# Patient Record
Sex: FEMALE | Race: ASIAN | NOT HISPANIC OR LATINO
[De-identification: names, ages, dates, MRNs, and addresses within clinical notes are randomized per-mention and may not be internally consistent; named-entity substitution may affect disease eponyms.]

---

## 2020-10-08 ENCOUNTER — NON-APPOINTMENT (OUTPATIENT)
Age: 50
End: 2020-10-08

## 2020-10-08 ENCOUNTER — APPOINTMENT (OUTPATIENT)
Dept: INTERNAL MEDICINE | Facility: CLINIC | Age: 50
End: 2020-10-08
Payer: MEDICAID

## 2020-10-08 VITALS
SYSTOLIC BLOOD PRESSURE: 134 MMHG | HEART RATE: 81 BPM | WEIGHT: 130 LBS | OXYGEN SATURATION: 98 % | DIASTOLIC BLOOD PRESSURE: 79 MMHG | BODY MASS INDEX: 23.04 KG/M2 | HEIGHT: 63 IN | TEMPERATURE: 98.6 F

## 2020-10-08 DIAGNOSIS — Z87.891 PERSONAL HISTORY OF NICOTINE DEPENDENCE: ICD-10-CM

## 2020-10-08 DIAGNOSIS — Z82.49 FAMILY HISTORY OF ISCHEMIC HEART DISEASE AND OTHER DISEASES OF THE CIRCULATORY SYSTEM: ICD-10-CM

## 2020-10-08 DIAGNOSIS — Z80.0 FAMILY HISTORY OF MALIGNANT NEOPLASM OF DIGESTIVE ORGANS: ICD-10-CM

## 2020-10-08 PROCEDURE — 36415 COLL VENOUS BLD VENIPUNCTURE: CPT

## 2020-10-08 PROCEDURE — G0444 DEPRESSION SCREEN ANNUAL: CPT | Mod: NC,59

## 2020-10-08 PROCEDURE — 93000 ELECTROCARDIOGRAM COMPLETE: CPT | Mod: 59

## 2020-10-08 PROCEDURE — 99386 PREV VISIT NEW AGE 40-64: CPT | Mod: 25

## 2020-10-08 NOTE — HISTORY OF PRESENT ILLNESS
[FreeTextEntry1] : New patient physical [de-identified] : 49 y/o f. hx of Sjogren's on plaquenil BID (recent optho exam completed) presenting for CPE\par Notes elevation of SBP to 140s at rheumatologist\par Endorses high stress relate to father's diagnosis of pancreatic cancer and long work hours. 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 Pain resolves spontaneously.  Also with FREEMAN with some activities. \par Not utd w/ mammogram, no prior colonscopy\par Mood is stable -good family support \par

## 2020-10-08 NOTE — PHYSICAL EXAM
[No Acute Distress] : no acute distress [Well Nourished] : well nourished [Well Developed] : well developed [Well-Appearing] : well-appearing [Normal Sclera/Conjunctiva] : normal sclera/conjunctiva [PERRL] : pupils equal round and reactive to light [EOMI] : extraocular movements intact [No JVD] : no jugular venous distention [No Lymphadenopathy] : no lymphadenopathy [Supple] : supple [Thyroid Normal, No Nodules] : the thyroid was normal and there were no nodules present [No Respiratory Distress] : no respiratory distress  [No Accessory Muscle Use] : no accessory muscle use [Clear to Auscultation] : lungs were clear to auscultation bilaterally [Normal Rate] : normal rate  [Regular Rhythm] : with a regular rhythm [Normal S1, S2] : normal S1 and S2 [No Murmur] : no murmur heard [Pedal Pulses Present] : the pedal pulses are present [No Edema] : there was no peripheral edema [No Extremity Clubbing/Cyanosis] : no extremity clubbing/cyanosis [Soft] : abdomen soft [Non Tender] : non-tender [Non-distended] : non-distended [No Masses] : no abdominal mass palpated [No HSM] : no HSM [Normal Bowel Sounds] : normal bowel sounds [Normal Posterior Cervical Nodes] : no posterior cervical lymphadenopathy [Normal Anterior Cervical Nodes] : no anterior cervical lymphadenopathy [No CVA Tenderness] : no CVA  tenderness [No Spinal Tenderness] : no spinal tenderness [No Joint Swelling] : no joint swelling [Grossly Normal Strength/Tone] : grossly normal strength/tone [No Rash] : no rash [Coordination Grossly Intact] : coordination grossly intact [No Focal Deficits] : no focal deficits [Normal Gait] : normal gait [Normal Affect] : the affect was normal [Normal Insight/Judgement] : insight and judgment were intact

## 2020-10-08 NOTE — HEALTH RISK ASSESSMENT
[Yes] : Yes [Patient reported mammogram was normal] : Patient reported mammogram was normal [Employed] : employed [] :  [# Of Children ___] : has [unfilled] children [Fully functional (bathing, dressing, toileting, transferring, walking, feeding)] : Fully functional (bathing, dressing, toileting, transferring, walking, feeding) [Fully functional (using the telephone, shopping, preparing meals, housekeeping, doing laundry, using] : Fully functional and needs no help or supervision to perform IADLs (using the telephone, shopping, preparing meals, housekeeping, doing laundry, using transportation, managing medications and managing finances) [] : No [de-identified] : Former smoker [Reports changes in hearing] : Reports no changes in hearing [Reports changes in vision] : Reports no changes in vision [MammogramDate] : 01/18 [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

## 2020-10-09 LAB
BASOPHILS # BLD AUTO: 0.09 K/UL
BASOPHILS NFR BLD AUTO: 1.2 %
EOSINOPHIL # BLD AUTO: 0.08 K/UL
EOSINOPHIL NFR BLD AUTO: 1.1 %
ESTIMATED AVERAGE GLUCOSE: 105 MG/DL
HBA1C MFR BLD HPLC: 5.3 %
HCT VFR BLD CALC: 39.8 %
HGB BLD-MCNC: 12.8 G/DL
IMM GRANULOCYTES NFR BLD AUTO: 0.3 %
LYMPHOCYTES # BLD AUTO: 2.69 K/UL
LYMPHOCYTES NFR BLD AUTO: 35.5 %
MAN DIFF?: NORMAL
MCHC RBC-ENTMCNC: 30.3 PG
MCHC RBC-ENTMCNC: 32.2 GM/DL
MCV RBC AUTO: 94.1 FL
MONOCYTES # BLD AUTO: 0.52 K/UL
MONOCYTES NFR BLD AUTO: 6.9 %
NEUTROPHILS # BLD AUTO: 4.18 K/UL
NEUTROPHILS NFR BLD AUTO: 55 %
PLATELET # BLD AUTO: 208 K/UL
RBC # BLD: 4.23 M/UL
RBC # FLD: 12.4 %
WBC # FLD AUTO: 7.58 K/UL

## 2020-10-12 LAB
25(OH)D3 SERPL-MCNC: 60.9 NG/ML
ALBUMIN SERPL ELPH-MCNC: 4.9 G/DL
ALP BLD-CCNC: 73 U/L
ALT SERPL-CCNC: 16 U/L
ANION GAP SERPL CALC-SCNC: 15 MMOL/L
APPEARANCE: CLEAR
AST SERPL-CCNC: 18 U/L
BILIRUB SERPL-MCNC: 0.4 MG/DL
BILIRUBIN URINE: NEGATIVE
BLOOD URINE: NEGATIVE
BUN SERPL-MCNC: 18 MG/DL
CALCIUM SERPL-MCNC: 9.9 MG/DL
CHLORIDE SERPL-SCNC: 100 MMOL/L
CHOLEST SERPL-MCNC: 203 MG/DL
CHOLEST/HDLC SERPL: 2 RATIO
CO2 SERPL-SCNC: 26 MMOL/L
COLOR: NORMAL
CREAT SERPL-MCNC: 0.7 MG/DL
GLUCOSE QUALITATIVE U: NEGATIVE
GLUCOSE SERPL-MCNC: 110 MG/DL
HDLC SERPL-MCNC: 103 MG/DL
KETONES URINE: NEGATIVE
LDLC SERPL CALC-MCNC: 85 MG/DL
LEUKOCYTE ESTERASE URINE: NEGATIVE
NITRITE URINE: NEGATIVE
PH URINE: 6
POTASSIUM SERPL-SCNC: 3.7 MMOL/L
PROT SERPL-MCNC: 7.3 G/DL
PROTEIN URINE: NEGATIVE
SODIUM SERPL-SCNC: 142 MMOL/L
SPECIFIC GRAVITY URINE: 1.01
TRIGL SERPL-MCNC: 79 MG/DL
UROBILINOGEN URINE: NORMAL

## 2020-10-19 ENCOUNTER — APPOINTMENT (OUTPATIENT)
Dept: CARDIOLOGY | Facility: CLINIC | Age: 50
End: 2020-10-19

## 2020-12-07 ENCOUNTER — APPOINTMENT (OUTPATIENT)
Dept: CARDIOLOGY | Facility: CLINIC | Age: 50
End: 2020-12-07
Payer: MEDICAID

## 2020-12-07 ENCOUNTER — NON-APPOINTMENT (OUTPATIENT)
Age: 50
End: 2020-12-07

## 2020-12-07 VITALS
HEART RATE: 74 BPM | SYSTOLIC BLOOD PRESSURE: 139 MMHG | HEIGHT: 63 IN | OXYGEN SATURATION: 99 % | DIASTOLIC BLOOD PRESSURE: 83 MMHG | RESPIRATION RATE: 16 BRPM | TEMPERATURE: 98.6 F | BODY MASS INDEX: 23.57 KG/M2 | WEIGHT: 133 LBS

## 2020-12-07 PROCEDURE — 99072 ADDL SUPL MATRL&STAF TM PHE: CPT

## 2020-12-07 PROCEDURE — 99203 OFFICE O/P NEW LOW 30 MIN: CPT

## 2020-12-07 PROCEDURE — 93000 ELECTROCARDIOGRAM COMPLETE: CPT

## 2020-12-07 NOTE — REASON FOR VISIT
[FreeTextEntry1] : 50F Sjogren’s on plaquenil who presents for evaluation of atypical chest pain.\par \par For the past few months reports atypical chest pressure. Had a difficult year with loss of family members. The frequency of these episodes has decreased since the summer.\par \par She is less active due to the weather, pandemic, and due to long hours at work. No dizziness, palpitations, orthopnea, or PND. Had pedal edema only at the end of the day. Some mild FREEMAN with two flights of stairs.\par \par \par

## 2020-12-07 NOTE — DISCUSSION/SUMMARY
[FreeTextEntry1] : FREEMAN and atypical chest pain\par Sjogren's\par mild abnormal ECG\par mildly elevated BP\par \par -check TTE for structural heart disease and to assess PA pressures\par -check treadmill stress test for ischemic ECG changes, HR/BP response to exercise\par -she is already incorporating a heart-healthy diet into her routine. encouraged low-sodium diet. If exercise stress testing is without significant findings, will discuss the role for moderate to high intensity exercise for cardiovascular health.

## 2020-12-07 NOTE — REVIEW OF SYSTEMS
[Fever] : no fever [Chills] : no chills [Feeling Fatigued] : feeling fatigued [see HPI] : see HPI [Abdominal Pain] : no abdominal pain [Heartburn] : no heartburn [Dysphagia] : no dysphagia [Negative] : Heme/Lymph

## 2020-12-07 NOTE — PHYSICAL EXAM
[Normal Appearance] : normal appearance [General Appearance - In No Acute Distress] : no acute distress [Eyelids - No Xanthelasma] : the eyelids demonstrated no xanthelasmas [Normal Jugular Venous A Waves Present] : normal jugular venous A waves present [Normal Jugular Venous V Waves Present] : normal jugular venous V waves present [No Jugular Venous Evans A Waves] : no jugular venous evans A waves [Heart Rate And Rhythm] : heart rate and rhythm were normal [Heart Sounds] : normal S1 and S2 [Murmurs] : no murmurs present [Arterial Pulses Normal] : the arterial pulses were normal [Respiration, Rhythm And Depth] : normal respiratory rhythm and effort [Exaggerated Use Of Accessory Muscles For Inspiration] : no accessory muscle use [Auscultation Breath Sounds / Voice Sounds] : lungs were clear to auscultation bilaterally [Abdomen Soft] : soft [Abdomen Tenderness] : non-tender [Abnormal Walk] : normal gait [Nail Clubbing] : no clubbing of the fingernails [Cyanosis, Localized] : no localized cyanosis [Skin Color & Pigmentation] : normal skin color and pigmentation [Skin Turgor] : normal skin turgor [] : no rash [Oriented To Time, Place, And Person] : oriented to person, place, and time [Impaired Insight] : insight and judgment were intact [Affect] : the affect was normal [Mood] : the mood was normal

## 2020-12-14 ENCOUNTER — APPOINTMENT (OUTPATIENT)
Dept: CARDIOLOGY | Facility: CLINIC | Age: 50
End: 2020-12-14
Payer: MEDICAID

## 2020-12-14 PROCEDURE — 93015 CV STRESS TEST SUPVJ I&R: CPT

## 2020-12-14 PROCEDURE — 99212 OFFICE O/P EST SF 10 MIN: CPT | Mod: 25

## 2020-12-14 PROCEDURE — 93306 TTE W/DOPPLER COMPLETE: CPT

## 2020-12-14 PROCEDURE — 99072 ADDL SUPL MATRL&STAF TM PHE: CPT

## 2020-12-14 NOTE — DISCUSSION/SUMMARY
[FreeTextEntry1] : FREEMAN and atypical chest pain\par Sjogren's\par mild abnormal ECG\par mildly elevated BP\par \par -TTE today normal. nl PA pressures and RV function\par -Treadmill stress test - [place]co protocol [id number] stopped for fatigue\par No ischemic ECG changes.\par blunted BP/normal HR response to exercise\par \par We discussed incorporating 75 min/weekly of high intensity exercise interspersed with resistance training/active time as tolerated. She is often on her feet and works long days; this may be contributing to her fatigue.\par

## 2020-12-14 NOTE — REASON FOR VISIT
[FreeTextEntry1] : 50F Sjogren’s on plaquenil who presents for stress testing for the evaluation of atypical chest pain\par \par For the past few months reports atypical chest pressure. Had a difficult year with loss of family members. The frequency of these episodes has decreased since the summer.\par \par She is less active due to the weather, pandemic, and due to long hours at work. No dizziness, palpitations, orthopnea, or PND. Had pedal edema only at the end of the day. Some mild FREEMAN with two flights of stairs.\par

## 2020-12-14 NOTE — PHYSICAL EXAM
[Normal Appearance] : normal appearance [General Appearance - In No Acute Distress] : no acute distress [Eyelids - No Xanthelasma] : the eyelids demonstrated no xanthelasmas [Normal Jugular Venous A Waves Present] : normal jugular venous A waves present [Normal Jugular Venous V Waves Present] : normal jugular venous V waves present [No Jugular Venous Evans A Waves] : no jugular venous evans A waves [Respiration, Rhythm And Depth] : normal respiratory rhythm and effort [Exaggerated Use Of Accessory Muscles For Inspiration] : no accessory muscle use [Auscultation Breath Sounds / Voice Sounds] : lungs were clear to auscultation bilaterally [Heart Rate And Rhythm] : heart rate and rhythm were normal [Heart Sounds] : normal S1 and S2 [Murmurs] : no murmurs present [Arterial Pulses Normal] : the arterial pulses were normal [Abdomen Soft] : soft [Abdomen Tenderness] : non-tender [Abnormal Walk] : normal gait [Nail Clubbing] : no clubbing of the fingernails [Cyanosis, Localized] : no localized cyanosis [Skin Color & Pigmentation] : normal skin color and pigmentation [Skin Turgor] : normal skin turgor [] : no rash [Oriented To Time, Place, And Person] : oriented to person, place, and time [Impaired Insight] : insight and judgment were intact [Affect] : the affect was normal [Mood] : the mood was normal

## 2021-02-18 ENCOUNTER — APPOINTMENT (OUTPATIENT)
Dept: RHEUMATOLOGY | Facility: CLINIC | Age: 51
End: 2021-02-18
Payer: MEDICAID

## 2021-02-18 PROCEDURE — 99203 OFFICE O/P NEW LOW 30 MIN: CPT | Mod: 95

## 2021-02-18 NOTE — HISTORY OF PRESENT ILLNESS
[Home] : at home, [unfilled] , at the time of the visit. [Other Location: e.g. Home (Enter Location, City,State)___] : at [unfilled] [Verbal consent obtained from patient] : the patient, [unfilled] [FreeTextEntry1] : 50 year old female with PMH as listed below presents today for an initial evaluation/establish care\par \par Previously following rheumatology, Dr. David Fleming at [facility]. Has hx of of intermittent facial rash/flushing \par raynauds to BL hands and and feet, polyarthralgias, myalgia, fatigue, dry mouth. Dx with Sjogrens syndrome. Started on treatment with HCQ 200mg BID. Tolerating medication well. Denies side effects. Overall notes improvement in symptoms on HCQ. \par \par Up to date with opthalmology. \par Currently doing well. Has intermittent polyarthralgias, fatigue. Using OTC topical analgesics prn with relief of symptoms. \par \par denies fever, chills, blood in the urine, photosensitivity, hair loss, skin thickening, blood clots\par \par

## 2021-02-18 NOTE — ASSESSMENT
[FreeTextEntry1] : 50 year old female presents today for an initial evaluation/establish care for Sjogrens syndrome. Currently doing well on HCQ 200mg BID. Tolerating medication well. Denies side effects\par \par - labs as below\par - c.w HCQ 200mg BID\par - opthalmology f.u\par - NSAIDS/Tylenol prn for pain \par - OTC topical analgesics\par - c/w biotin mouthwash/spray\par - encouraged hydration, lozenges, omega 3 FA supplementation \par - avoidance of caffeine, sugars, candy, food that can exacerbate dry mouth\par \par Discussed treatment plan with the patient. The patient was given the opportunity to ask questions and all questions were answered to their satisfaction.

## 2021-02-20 DIAGNOSIS — Z83.3 FAMILY HISTORY OF DIABETES MELLITUS: ICD-10-CM

## 2021-03-11 ENCOUNTER — LABORATORY RESULT (OUTPATIENT)
Age: 51
End: 2021-03-11

## 2021-03-15 ENCOUNTER — LABORATORY RESULT (OUTPATIENT)
Age: 51
End: 2021-03-15

## 2021-03-28 LAB
25(OH)D3 SERPL-MCNC: 55.6 NG/ML
ALBUMIN MFR SERPL ELPH: 61.3 %
ALBUMIN SERPL ELPH-MCNC: 4.4 G/DL
ALBUMIN SERPL-MCNC: 4.4 G/DL
ALBUMIN/GLOB SERPL: 1.6 RATIO
ALBUPE: 16.3 %
ALDOLASE SERPL-CCNC: 4.5 U/L
ALP BLD-CCNC: 81 U/L
ALPHA1 GLOB MFR SERPL ELPH: 2.8 %
ALPHA1 GLOB SERPL ELPH-MCNC: 0.2 G/DL
ALPHA1UPE: 32.9 %
ALPHA2 GLOB MFR SERPL ELPH: 8.6 %
ALPHA2 GLOB SERPL ELPH-MCNC: 0.6 G/DL
ALPHA2UPE: 21.9 %
ALT SERPL-CCNC: 21 U/L
ANA PAT FLD IF-IMP: ABNORMAL
ANA SER IF-ACNC: ABNORMAL
ANION GAP SERPL CALC-SCNC: 12 MMOL/L
APPEARANCE: CLEAR
AST SERPL-CCNC: 23 U/L
B-GLOBULIN MFR SERPL ELPH: 10.4 %
B-GLOBULIN SERPL ELPH-MCNC: 0.7 G/DL
BASOPHILS # BLD AUTO: 0.09 K/UL
BASOPHILS NFR BLD AUTO: 1.4 %
BETAUPE: 15.1 %
BILIRUB SERPL-MCNC: 0.3 MG/DL
BILIRUBIN URINE: NEGATIVE
BLOOD URINE: NEGATIVE
BUN SERPL-MCNC: 20 MG/DL
C3 SERPL-MCNC: 102 MG/DL
C4 SERPL-MCNC: 36 MG/DL
CALCIUM SERPL-MCNC: 9.5 MG/DL
CCP AB SER IA-ACNC: <8 UNITS
CHLORIDE SERPL-SCNC: 103 MMOL/L
CK SERPL-CCNC: 101 U/L
CO2 SERPL-SCNC: 26 MMOL/L
COLOR: NORMAL
CREAT 24H UR-MCNC: NORMAL G/24 H
CREAT SERPL-MCNC: 0.79 MG/DL
CREAT SPEC-SCNC: 68 MG/DL
CREAT/PROT UR: 0.1 RATIO
CREATININE UR (MAYO): 66 MG/DL
CRP SERPL-MCNC: <3 MG/L
DSDNA AB SER-ACNC: <12 IU/ML
ENA RNP AB SER IA-ACNC: <0.2 AL
ENA RNP AB SER IA-ACNC: <0.2 AL
ENA SCL70 IGG SER IA-ACNC: <0.2 AL
ENA SM AB SER IA-ACNC: <0.2 AL
ENA SS-A AB SER IA-ACNC: 7.1 AL
ENA SS-B AB SER IA-ACNC: <0.2 AL
EOSINOPHIL # BLD AUTO: 0.12 K/UL
EOSINOPHIL NFR BLD AUTO: 1.9 %
ERYTHROCYTE [SEDIMENTATION RATE] IN BLOOD BY WESTERGREN METHOD: 5 MM/HR
G6PD SER-CCNC: 15 U/G HGB
GAMMA GLOB FLD ELPH-MCNC: 1.2 G/DL
GAMMA GLOB MFR SERPL ELPH: 16.9 %
GAMMAUPE: 13.8 %
GLUCOSE QUALITATIVE U: NEGATIVE
GLUCOSE SERPL-MCNC: 91 MG/DL
HAV IGM SER QL: NONREACTIVE
HBV CORE IGG+IGM SER QL: NONREACTIVE
HBV CORE IGM SER QL: NONREACTIVE
HBV E AB SER QL: NONREACTIVE
HBV E AG SER QL: NONREACTIVE
HBV SURFACE AB SER QL: REACTIVE
HBV SURFACE AG SER QL: NONREACTIVE
HCT VFR BLD CALC: 37.7 %
HCV AB SER QL: NONREACTIVE
HCV S/CO RATIO: 0.06 S/CO
HEPATITIS A IGG ANTIBODY: REACTIVE
HEPATITIS A IGG ANTIBODY: REACTIVE
HGB BLD-MCNC: 12.1 G/DL
IGA 24H UR QL IFE: NORMAL
IMM GRANULOCYTES NFR BLD AUTO: 0.2 %
INTERPRETATION SERPL IEP-IMP: NORMAL
KAPPA LC 24H UR QL: NORMAL
KETONES URINE: NEGATIVE
LEUKOCYTE ESTERASE URINE: NEGATIVE
LYMPHOCYTES # BLD AUTO: 2.56 K/UL
LYMPHOCYTES NFR BLD AUTO: 39.9 %
M PROTEIN SPEC IFE-MCNC: NORMAL
MAN DIFF?: NORMAL
MCHC RBC-ENTMCNC: 30.6 PG
MCHC RBC-ENTMCNC: 32.1 GM/DL
MCV RBC AUTO: 95.2 FL
MONOCYTES # BLD AUTO: 0.47 K/UL
MONOCYTES NFR BLD AUTO: 7.3 %
NEUTROPHILS # BLD AUTO: 3.17 K/UL
NEUTROPHILS NFR BLD AUTO: 49.3 %
NITRITE URINE: NEGATIVE
PH URINE: 7
PLATELET # BLD AUTO: 214 K/UL
POTASSIUM SERPL-SCNC: 3.7 MMOL/L
PROT PATTERN 24H UR ELPH-IMP: NORMAL
PROT SERPL-MCNC: 7.1 G/DL
PROT UR-MCNC: 4 MG/DL
PROT UR-MCNC: 5 MG/DL
PROT UR-MCNC: 6 MG/DL
PROTEIN URINE: NORMAL
RBC # BLD: 3.96 M/UL
RBC # FLD: 12.6 %
RF+CCP IGG SER-IMP: NEGATIVE
RHEUMATOID FACT SER QL: 11 IU/ML
SODIUM SERPL-SCNC: 141 MMOL/L
SPECIFIC GRAVITY URINE: 1.03
SPECIMEN VOL 24H UR: NORMAL ML
THYROGLOB AB SERPL-ACNC: <20 IU/ML
THYROPEROXIDASE AB SERPL IA-ACNC: 22.9 IU/ML
THYROPEROXIDASE AB SERPL IA-ACNC: 26.8 IU/ML
TSH SERPL-ACNC: 3.58 UIU/ML
UROBILINOGEN URINE: NORMAL
WBC # FLD AUTO: 6.42 K/UL

## 2021-04-14 ENCOUNTER — NON-APPOINTMENT (OUTPATIENT)
Age: 51
End: 2021-04-14

## 2021-04-15 ENCOUNTER — APPOINTMENT (OUTPATIENT)
Dept: INTERNAL MEDICINE | Facility: CLINIC | Age: 51
End: 2021-04-15
Payer: MEDICAID

## 2021-04-15 VITALS
HEART RATE: 86 BPM | OXYGEN SATURATION: 99 % | SYSTOLIC BLOOD PRESSURE: 120 MMHG | BODY MASS INDEX: 23.04 KG/M2 | HEIGHT: 63 IN | TEMPERATURE: 98.8 F | WEIGHT: 130 LBS | DIASTOLIC BLOOD PRESSURE: 75 MMHG

## 2021-04-15 DIAGNOSIS — R53.83 OTHER FATIGUE: ICD-10-CM

## 2021-04-15 PROCEDURE — 99072 ADDL SUPL MATRL&STAF TM PHE: CPT

## 2021-04-15 PROCEDURE — 99213 OFFICE O/P EST LOW 20 MIN: CPT | Mod: 25

## 2021-04-15 PROCEDURE — 36415 COLL VENOUS BLD VENIPUNCTURE: CPT

## 2021-04-15 RX ORDER — DOXYCYCLINE 100 MG/1
100 CAPSULE ORAL
Qty: 14 | Refills: 0 | Status: ACTIVE | COMMUNITY
Start: 2021-04-15 | End: 1900-01-01

## 2021-04-15 NOTE — HISTORY OF PRESENT ILLNESS
[FreeTextEntry8] : CC: Sinus pressure\par \par Complaining of sinus pressure, headache for past two weeks. More tired than usual.\par  No associated fever or chills.\par Hx of sinusitis in the past.\par Last dose of COVID vaccine in Mid March. \par

## 2021-04-15 NOTE — PHYSICAL EXAM
[Normal Oropharynx] : the oropharynx was normal [No Lymphadenopathy] : no lymphadenopathy [Normal] : no posterior cervical lymphadenopathy and no anterior cervical lymphadenopathy

## 2021-04-15 NOTE — REVIEW OF SYSTEMS
[Postnasal Drip] : postnasal drip [Headache] : headache [Negative] : Psychiatric [Nasal Discharge] : no nasal discharge [Sore Throat] : no sore throat [Dizziness] : no dizziness

## 2021-04-16 LAB
BASOPHILS # BLD AUTO: 0.08 K/UL
BASOPHILS NFR BLD AUTO: 1 %
EOSINOPHIL # BLD AUTO: 0.11 K/UL
EOSINOPHIL NFR BLD AUTO: 1.4 %
HCT VFR BLD CALC: 38.3 %
HGB BLD-MCNC: 12.6 G/DL
IMM GRANULOCYTES NFR BLD AUTO: 0.1 %
LYMPHOCYTES # BLD AUTO: 2.65 K/UL
LYMPHOCYTES NFR BLD AUTO: 34.1 %
MAN DIFF?: NORMAL
MCHC RBC-ENTMCNC: 31 PG
MCHC RBC-ENTMCNC: 32.9 GM/DL
MCV RBC AUTO: 94.1 FL
MONOCYTES # BLD AUTO: 0.46 K/UL
MONOCYTES NFR BLD AUTO: 5.9 %
NEUTROPHILS # BLD AUTO: 4.47 K/UL
NEUTROPHILS NFR BLD AUTO: 57.5 %
PLATELET # BLD AUTO: 276 K/UL
RBC # BLD: 4.07 M/UL
RBC # FLD: 12 %
TSH SERPL-ACNC: 2.55 UIU/ML
WBC # FLD AUTO: 7.78 K/UL

## 2021-06-16 ENCOUNTER — RX RENEWAL (OUTPATIENT)
Age: 51
End: 2021-06-16

## 2021-06-16 RX ORDER — FLUTICASONE PROPIONATE 50 UG/1
50 SPRAY, METERED NASAL
Qty: 16 | Refills: 1 | Status: ACTIVE | COMMUNITY
Start: 2021-04-15 | End: 1900-01-01

## 2021-08-12 ENCOUNTER — APPOINTMENT (OUTPATIENT)
Dept: RHEUMATOLOGY | Facility: CLINIC | Age: 51
End: 2021-08-12
Payer: MEDICAID

## 2021-08-12 VITALS
OXYGEN SATURATION: 99 % | HEART RATE: 72 BPM | WEIGHT: 125 LBS | BODY MASS INDEX: 21.87 KG/M2 | SYSTOLIC BLOOD PRESSURE: 136 MMHG | RESPIRATION RATE: 16 BRPM | DIASTOLIC BLOOD PRESSURE: 86 MMHG | HEIGHT: 63.3 IN | TEMPERATURE: 97.9 F

## 2021-08-12 DIAGNOSIS — Z00.00 ENCOUNTER FOR GENERAL ADULT MEDICAL EXAMINATION W/OUT ABNORMAL FINDINGS: ICD-10-CM

## 2021-08-12 DIAGNOSIS — J01.90 ACUTE SINUSITIS, UNSPECIFIED: ICD-10-CM

## 2021-08-12 DIAGNOSIS — R06.00 DYSPNEA, UNSPECIFIED: ICD-10-CM

## 2021-08-12 DIAGNOSIS — Z13.31 ENCOUNTER FOR SCREENING FOR DEPRESSION: ICD-10-CM

## 2021-08-12 DIAGNOSIS — Z28.21 IMMUNIZATION NOT CARRIED OUT BECAUSE OF PATIENT REFUSAL: ICD-10-CM

## 2021-08-12 DIAGNOSIS — R03.0 ELEVATED BLOOD-PRESSURE READING, W/OUT DIAGNOSIS OF HYPERTENSION: ICD-10-CM

## 2021-08-12 DIAGNOSIS — R53.82 CHRONIC FATIGUE, UNSPECIFIED: ICD-10-CM

## 2021-08-12 DIAGNOSIS — R07.9 CHEST PAIN, UNSPECIFIED: ICD-10-CM

## 2021-08-12 DIAGNOSIS — M79.10 MYALGIA, UNSPECIFIED SITE: ICD-10-CM

## 2021-08-12 PROCEDURE — 99213 OFFICE O/P EST LOW 20 MIN: CPT

## 2021-08-13 NOTE — ASSESSMENT
[FreeTextEntry1] : 50 year old female presents today for an f.u visit for Sjogrens syndrome. Currently doing well on HCQ 200mg BID. Tolerating medication well. Denies side effects\par \par - c.w HCQ 200mg BID\par - opthalmology f.u\par - NSAIDS/Tylenol prn for pain \par - OTC topical analgesics\par - c/w biotin mouthwash/spray\par - encouraged hydration, lozenges, omega 3 FA supplementation \par - avoidance of caffeine, sugars, candy, food that can exacerbate dry mouth\par \par Discussed treatment plan with the patient. The patient was given the opportunity to ask questions and all questions were answered to their satisfaction.

## 2021-08-13 NOTE — PHYSICAL EXAM
[General Appearance - Alert] : alert [General Appearance - In No Acute Distress] : in no acute distress [General Appearance - Well Nourished] : well nourished [General Appearance - Well Developed] : well developed [Sclera] : the sclera and conjunctiva were normal [Outer Ear] : the ears and nose were normal in appearance [Neck Appearance] : the appearance of the neck was normal [Auscultation Breath Sounds / Voice Sounds] : lungs were clear to auscultation bilaterally [Heart Rate And Rhythm] : heart rate was normal and rhythm regular [Heart Sounds] : normal S1 and S2 [Heart Sounds Gallop] : no gallops [Murmurs] : no murmurs [Heart Sounds Pericardial Friction Rub] : no pericardial rub [Bowel Sounds] : normal bowel sounds [Abdomen Soft] : soft [Abdomen Tenderness] : non-tender [Abnormal Walk] : normal gait [Nail Clubbing] : no clubbing  or cyanosis of the fingernails [Musculoskeletal - Swelling] : no joint swelling seen [Motor Tone] : muscle strength and tone were normal [] : no rash [Skin Lesions] : no skin lesions [No Focal Deficits] : no focal deficits [Oriented To Time, Place, And Person] : oriented to person, place, and time

## 2021-08-13 NOTE — HISTORY OF PRESENT ILLNESS
[FreeTextEntry1] : Pt presenting today for  f.u visit. \par Currently doing well on HCQ 200mg BID. Tolerating medication well. Denies side effects. \par No current complaints. \par \par

## 2021-12-30 ENCOUNTER — APPOINTMENT (OUTPATIENT)
Dept: RHEUMATOLOGY | Facility: CLINIC | Age: 51
End: 2021-12-30
Payer: MEDICAID

## 2021-12-30 DIAGNOSIS — M25.50 PAIN IN UNSPECIFIED JOINT: ICD-10-CM

## 2021-12-30 DIAGNOSIS — I73.00 RAYNAUD'S SYNDROME W/OUT GANGRENE: ICD-10-CM

## 2021-12-30 DIAGNOSIS — M35.00 SICCA SYNDROME, UNSPECIFIED: ICD-10-CM

## 2021-12-30 PROCEDURE — 99214 OFFICE O/P EST MOD 30 MIN: CPT | Mod: 95

## 2021-12-31 NOTE — HISTORY OF PRESENT ILLNESS
[Home] : at home, [unfilled] , at the time of the visit. [Medical Office: ([facility])___] : at the medical office located in  [Verbal consent obtained from patient] : the patient, [unfilled] [FreeTextEntry1] : Pt presenting today for  f.u visit. \par Currently doing well on HCQ 200mg BID. Tolerating medication well. Denies side effects. \par No current complaints. \par \par

## 2021-12-31 NOTE — ASSESSMENT
[FreeTextEntry1] : 51 year old female presents today for an f.u visit for Sjogrens syndrome. Currently doing well on HCQ 200mg BID. Tolerating medication well. Denies side effects\par \par - c.w HCQ 200mg BID\par - opthalmology f.u\par - NSAIDS/Tylenol prn for pain \par - OTC topical analgesics\par - c/w biotin mouthwash/spray\par - encouraged hydration, lozenges, omega 3 FA supplementation \par - avoidance of caffeine, sugars, candy, food that can exacerbate dry mouth\par \par Discussed treatment plan with the patient. The patient was given the opportunity to ask questions and all questions were answered to their satisfaction.

## 2022-02-01 RX ORDER — HYDROXYCHLOROQUINE SULFATE 200 MG/1
200 TABLET, FILM COATED ORAL TWICE DAILY
Qty: 60 | Refills: 5 | Status: ACTIVE | COMMUNITY
Start: 1900-01-01 | End: 1900-01-01